# Patient Record
Sex: FEMALE | Race: WHITE
[De-identification: names, ages, dates, MRNs, and addresses within clinical notes are randomized per-mention and may not be internally consistent; named-entity substitution may affect disease eponyms.]

---

## 2018-09-10 ENCOUNTER — HOSPITAL ENCOUNTER (INPATIENT)
Dept: HOSPITAL 12 - SRC | Age: 33
LOS: 7 days | Discharge: OTHER INSTITUTION | DRG: 895 | End: 2018-09-17
Attending: INTERNAL MEDICINE | Admitting: INTERNAL MEDICINE
Payer: COMMERCIAL

## 2018-09-10 VITALS — DIASTOLIC BLOOD PRESSURE: 44 MMHG | SYSTOLIC BLOOD PRESSURE: 84 MMHG

## 2018-09-10 VITALS — DIASTOLIC BLOOD PRESSURE: 64 MMHG | SYSTOLIC BLOOD PRESSURE: 103 MMHG

## 2018-09-10 VITALS — BODY MASS INDEX: 35.89 KG/M2 | HEIGHT: 72 IN | WEIGHT: 265 LBS

## 2018-09-10 DIAGNOSIS — Z87.11: ICD-10-CM

## 2018-09-10 DIAGNOSIS — R00.2: ICD-10-CM

## 2018-09-10 DIAGNOSIS — R73.9: ICD-10-CM

## 2018-09-10 DIAGNOSIS — F10.239: Primary | ICD-10-CM
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
## 2018-09-10 DIAGNOSIS — Z65.3: ICD-10-CM

## 2018-09-10 DIAGNOSIS — F13.239: ICD-10-CM

## 2018-09-10 DIAGNOSIS — Z98.84: ICD-10-CM

## 2018-09-10 DIAGNOSIS — F60.3: ICD-10-CM

## 2018-09-10 DIAGNOSIS — F10.229: ICD-10-CM

## 2018-09-10 DIAGNOSIS — F31.81: ICD-10-CM

## 2018-09-10 DIAGNOSIS — F41.1: ICD-10-CM

## 2018-09-10 LAB
AMPHETAMINES UR QL SCN>1000 NG/ML: NEGATIVE
BARBITURATES UR QL SCN: NEGATIVE
BASOPHILS # BLD AUTO: 0.1 K/UL (ref 0–8)
BASOPHILS NFR BLD AUTO: 1.1 % (ref 0–2)
COCAINE UR QL SCN: NEGATIVE
EOSINOPHIL # BLD AUTO: 0.1 K/UL (ref 0–0.7)
EOSINOPHIL NFR BLD AUTO: 1.7 % (ref 0–7)
HCG UR QL: NEGATIVE
HCT VFR BLD AUTO: 36.8 % (ref 31.2–41.9)
HGB BLD-MCNC: 12.8 G/DL (ref 10.9–14.3)
LYMPHOCYTES # BLD AUTO: 2.3 K/UL (ref 20–40)
LYMPHOCYTES NFR BLD AUTO: 44.5 % (ref 20.5–51.5)
MCH RBC QN AUTO: 31.6 UUG (ref 24.7–32.8)
MCHC RBC AUTO-ENTMCNC: 35 G/DL (ref 32.3–35.6)
MCV RBC AUTO: 91 FL (ref 75.5–95.3)
MONOCYTES # BLD AUTO: 0.4 K/UL (ref 2–10)
MONOCYTES NFR BLD AUTO: 8.6 % (ref 0–11)
NEUTROPHILS # BLD AUTO: 2.3 K/UL (ref 1.8–8.9)
NEUTROPHILS NFR BLD AUTO: 44.1 % (ref 38.5–71.5)
OPIATES UR QL SCN: NEGATIVE
PCP UR QL SCN>25 NG/ML: NEGATIVE
PLATELET # BLD AUTO: 321 K/UL (ref 179–408)
RBC # BLD AUTO: 4.04 MIL/UL (ref 3.63–4.92)
THC UR QL SCN>50 NG/ML: NEGATIVE
WBC # BLD AUTO: 5.1 K/UL (ref 3.8–11.8)

## 2018-09-10 PROCEDURE — A4663 DIALYSIS BLOOD PRESSURE CUFF: HCPCS

## 2018-09-10 PROCEDURE — HZ2ZZZZ DETOXIFICATION SERVICES FOR SUBSTANCE ABUSE TREATMENT: ICD-10-PCS | Performed by: INTERNAL MEDICINE

## 2018-09-10 PROCEDURE — G0480 DRUG TEST DEF 1-7 CLASSES: HCPCS

## 2018-09-10 RX ADMIN — DEXTROSE ONE MG: 50 INJECTION, SOLUTION INTRAVENOUS at 19:50

## 2018-09-10 RX ADMIN — FOLIC ACID SCH MG: 1 TABLET ORAL at 18:30

## 2018-09-10 RX ADMIN — LORAZEPAM PRN MG: 1 TABLET ORAL at 20:21

## 2018-09-10 RX ADMIN — LORAZEPAM PRN MG: 1 TABLET ORAL at 23:04

## 2018-09-10 RX ADMIN — ACETAMINOPHEN PRN MG: 325 TABLET ORAL at 20:17

## 2018-09-10 RX ADMIN — THERA TABS SCH UDTAB: TAB at 18:30

## 2018-09-10 RX ADMIN — DEXTROSE ONE MG: 50 INJECTION, SOLUTION INTRAVENOUS at 17:30

## 2018-09-10 NOTE — NUR
PRN ATIVAN 1 MG AND TYLENOL ADMINISTRATION



CIWA 14. Pt presents with anxiety, flushed skin, agitation, fatigue, lethargy, headache 
4/10, increased HR, sweats, photosensitivity. Safety measures in place. Call light within 
reach. Will continue to monitor.
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
## 2018-09-10 NOTE — NUR
PRN ATIVAN 1 MG ADMINISTRATION



CIWA 11. Pt presents with anxiety, sweats, headache, photosensitivity. Safety measures in 
place. Call light within reach. Will continue to monitor.

-------------------------------------------------------------------------------

Addendum: 09/11/18 at 0142 by HIEU LACEY RN

-------------------------------------------------------------------------------

CORRECTION: CIWA 12

-------------------------------------------------------------------------------

Addendum: 09/11/18 at 0703 by HIEU LACEY RN

-------------------------------------------------------------------------------

BENADRYL ALSO ADMINISTERED. Pt requests sleep aid.

## 2018-09-10 NOTE — NUR
CIWA 14



Pt reports she feels like she is starting to withdrawal. Pt presents with anxiety, flushed 
skin, agitation, fatigue, lethargy, headache, increased HR, palpitations, sweats, 
photosensitivity, disheveled appearance and unkempt room. Pt is requesting Tylenol for 
headache.

## 2018-09-10 NOTE — NUR
END OF SHIFT

Report provided to night shift nurse. Pt is lying in bed resting. She is a 32 yo female 
admitted to [facility] for ETOH withdrawal. She was intoxicated on admission as is resting in 
bed. 5 day Ativan taper ordered to start tomorrow. She drank 480mL. Safety measures in 
place.

## 2018-09-10 NOTE — NUR
START OF SHIFT



Pt is a 34 y/o female admitted today for ETOH and benzo withdrawal. Pt will start a 5 day 
Ativan taper tomorrow morning, has PRN Ativan available tonight. CIWAs deferred during day 
shift d/t pt intoxication. Pt reports her last drink was around 1300. No PRNs administered. 
Upon assessment pt presents with anxiety, flushed skin, agitation, fatigue, lethargy, 
headache, increased HR, palpitations, sweats, photosensitivity, disheveled appearance and 
unkempt room. Medications due. Safety measures in place. Call light within reach. Will 
continue to monitor.

## 2018-09-10 NOTE — NUR
PRN ATIVAN 1 MG AND TYLENOL REASSESSMENT



CIWA 12. Pt has mild improvement in anxiety and headache. Pt reports that her anxiety is 
still her chief complaint. Safety measures in place. Call light within reach. Will continue 
to monitor.

## 2018-09-10 NOTE — NUR
Pre-admission

Pre-admission assessment performed in the intake department of [facility]. Pt 
is A&O and ambulatory. She was escorted into the hospital via wheelchair due to a recent 
metatarsal fracture of the right foot. She ambulates with a boot. She is moderately 
intoxicated but answers questions appropriately. Vital signs are: B/P 103/64, , RR 18, 
O2 sat 95%, T 98.0. She is friendly, her appearance is disheveled, and she smells of 
alcohol. Pt is stable and [facility]

## 2018-09-11 VITALS — DIASTOLIC BLOOD PRESSURE: 76 MMHG | SYSTOLIC BLOOD PRESSURE: 109 MMHG

## 2018-09-11 VITALS — DIASTOLIC BLOOD PRESSURE: 72 MMHG | SYSTOLIC BLOOD PRESSURE: 110 MMHG

## 2018-09-11 VITALS — SYSTOLIC BLOOD PRESSURE: 132 MMHG | DIASTOLIC BLOOD PRESSURE: 90 MMHG

## 2018-09-11 VITALS — SYSTOLIC BLOOD PRESSURE: 105 MMHG | DIASTOLIC BLOOD PRESSURE: 63 MMHG

## 2018-09-11 VITALS — SYSTOLIC BLOOD PRESSURE: 101 MMHG | DIASTOLIC BLOOD PRESSURE: 73 MMHG

## 2018-09-11 VITALS — DIASTOLIC BLOOD PRESSURE: 69 MMHG | SYSTOLIC BLOOD PRESSURE: 100 MMHG

## 2018-09-11 LAB
ALP SERPL-CCNC: 62 U/L (ref 50–136)
ALT SERPL W/O P-5'-P-CCNC: 19 U/L (ref 14–59)
AMYLASE SERPL-CCNC: 40 U/L (ref 25–115)
AST SERPL-CCNC: 17 U/L (ref 15–37)
BILIRUB SERPL-MCNC: 0.2 MG/DL (ref 0.2–1)
BUN SERPL-MCNC: 8 MG/DL (ref 7–18)
CHLORIDE SERPL-SCNC: 111 MMOL/L (ref 98–107)
CO2 SERPL-SCNC: 21 MMOL/L (ref 21–32)
CREAT SERPL-MCNC: 0.7 MG/DL (ref 0.6–1.3)
ETHANOL SERPL-MCNC: 115 MG/DL (ref 0–0)
GLUCOSE SERPL-MCNC: 124 MG/DL (ref 74–106)
LIPASE SERPL-CCNC: 261 U/L (ref 73–393)
MAGNESIUM SERPL-MCNC: 1.9 MG/DL (ref 1.8–2.4)
POTASSIUM SERPL-SCNC: 3.6 MMOL/L (ref 3.5–5.1)
TSH SERPL DL<=0.005 MIU/L-ACNC: 0.66 MIU/ML (ref 0.36–3.74)
WS STN SPEC: 6.8 G/DL (ref 6.4–8.2)

## 2018-09-11 PROCEDURE — HZ31ZZZ INDIVIDUAL COUNSELING FOR SUBSTANCE ABUSE TREATMENT, BEHAVIORAL: ICD-10-PCS

## 2018-09-11 PROCEDURE — HZ41ZZZ GROUP COUNSELING FOR SUBSTANCE ABUSE TREATMENT, BEHAVIORAL: ICD-10-PCS

## 2018-09-11 RX ADMIN — Medication SCH MG: at 21:55

## 2018-09-11 RX ADMIN — Medication SCH MG: at 13:26

## 2018-09-11 RX ADMIN — FOLIC ACID SCH MG: 1 TABLET ORAL at 08:50

## 2018-09-11 RX ADMIN — PANTOPRAZOLE SODIUM SCH MG: 40 TABLET, DELAYED RELEASE ORAL at 13:26

## 2018-09-11 RX ADMIN — THERA TABS SCH UDTAB: TAB at 08:50

## 2018-09-11 RX ADMIN — DIAZEPAM SCH MG: 10 TABLET ORAL at 16:33

## 2018-09-11 RX ADMIN — DIAZEPAM SCH MG: 10 TABLET ORAL at 21:55

## 2018-09-11 RX ADMIN — LAMOTRIGINE SCH MG: 100 TABLET ORAL at 21:55

## 2018-09-11 RX ADMIN — Medication SCH MG: at 08:50

## 2018-09-11 RX ADMIN — DIAZEPAM SCH MG: 10 TABLET ORAL at 13:56

## 2018-09-11 NOTE — NUR
Start of Shift



Patient presents with fatigue and tremors. Patient noted to be in a sad demeanor, is 
isolative, melancholic and emotional. Patient c/o increasing anxiety and easily gets 
agitated. Patient observed to avoid eye contact but is cooperative and compliant. Patient 
with unkempt hair and room with scattered candy wrappers. Encouraged patient to keep room 
clean, maintain a good hygiene and emphasized importance of a clean environment. Provided 
education on plan of care, medications, treatment and non-pharmacological ways to minimize 
anxiety. Fall, universal, seizure and safety prec in place. Call light within reach. Latest 
CIWA=12. Will continue to monitor.

## 2018-09-11 NOTE — NUR
CIWA 10



Pt has been isolative in room, intermittently sleeping, has an anxious mood and flat affect, 
presents anxiety, facial flushing, restlessness, fatigue, headache, sweating, light 
sensitivity, tremors are felt. Valium taper will be administered.

## 2018-09-11 NOTE — NUR
Start Of Shift / CIWA 11



Pt is a 32 y/o F admitted last night 09/10/18 for medically supervised ETOH and 
benzo-klonopin withdrawal. Pt will begin a 5 day ativan taper this morning. Received pt 
sitting in bed watching tv, A/Ox4, respirations even and unlabored. Pt is flushed and has a 
disheveled appearance, anxious mood, presents anxiety, restlessness, fatigue, headache, 
sweating, light sensitivity, tremors are felt. Encouraged pt to increase fluids as tolerated 
to facilitate in detox. Educated pt with med regimen and plan of care. Last CIWA 11 @MN, 
slept 8 hrs. Ativan 1 mg x2, tylenol prns were given last shift. Side rails upx2 and padded, 
bed in lowest position. Call light is within reach. Safety measures in place. Will continue 
to monitor.

## 2018-09-11 NOTE — NUR
PRN 



Tylenol 650 mg po prn given for headache 5/10 pain. Pt states she suddenly felt the headache 
once she had gotten up and was walking around. Safety measures in place. Will continue to 
monitor.

## 2018-09-11 NOTE — NUR
End Of Shift



Pt has been sleeping intermittently throughout shift. Ativan taper was replaced with Valium 
taper that started today and tolerating well. Last CIWA 10. Tylenol 650 mg po prn was given 
and effective for headache. Safety measures in place.

## 2018-09-11 NOTE — NUR
PRN ATIVAN 1 MG REASSESSMENT



CIWA 11. Pt has mild improvement in anxiety. Flushed skin, photosensitivity and mild 
headache present. Safety measures in place. Call light within reach. Will continue to 
monitor.

-------------------------------------------------------------------------------

Addendum: 09/11/18 at 0708 by HIEU LACEY RN

-------------------------------------------------------------------------------

PRN BENADRYL REASSESSMENT 



Pt appears more fatigued, reports that she will likely fall asleep soon.

## 2018-09-11 NOTE — NUR
END OF SHIFT



Pt is a 32 y/o female admitted today for ETOH and benzo withdrawal. Pt will start a 5 day 
Ativan taper today, has PRN Ativan available. Pt presented with anxiety, flushed skin, 
agitation, fatigue, lethargy, headache, increased HR, palpitations, sweats, 
photosensitivity, disheveled appearance and unkempt room. Scheduled medications and PRN 
Tylenol and Ativan 1 mg x 2 administered, effective in S/S of withdrawal AEB CIWA 14 lowered 
to CIWA 11. Pt slept 8 hours. Intake 500 ml, void x 2, stool x 0. Safety measures in place. 
Call light within reach. Pts needs have been met. Endorsed to day shift nurse.

## 2018-09-11 NOTE — NUR
CIWA 10



Pt is laying in bed watching tv, is flushed, has an anxious mood and flat affect, presents 
anxiety, restlessness, fatigue, headache, sweating, light sensitivity, tremors are felt. 
Refuses prns. Safety measures in place.

## 2018-09-11 NOTE — NUR
Reassessment



Pt verbalized tylenol was effective in decreasing headache pain to 3/10. Will continue to 
monitor.

## 2018-09-12 VITALS — SYSTOLIC BLOOD PRESSURE: 133 MMHG | DIASTOLIC BLOOD PRESSURE: 53 MMHG

## 2018-09-12 VITALS — DIASTOLIC BLOOD PRESSURE: 62 MMHG | SYSTOLIC BLOOD PRESSURE: 110 MMHG

## 2018-09-12 VITALS — DIASTOLIC BLOOD PRESSURE: 51 MMHG | SYSTOLIC BLOOD PRESSURE: 96 MMHG

## 2018-09-12 VITALS — SYSTOLIC BLOOD PRESSURE: 108 MMHG | DIASTOLIC BLOOD PRESSURE: 69 MMHG

## 2018-09-12 VITALS — SYSTOLIC BLOOD PRESSURE: 99 MMHG | DIASTOLIC BLOOD PRESSURE: 63 MMHG

## 2018-09-12 VITALS — DIASTOLIC BLOOD PRESSURE: 51 MMHG | SYSTOLIC BLOOD PRESSURE: 106 MMHG

## 2018-09-12 RX ADMIN — Medication SCH MG: at 08:22

## 2018-09-12 RX ADMIN — DIAZEPAM SCH MG: 10 TABLET ORAL at 21:39

## 2018-09-12 RX ADMIN — Medication SCH MG: at 21:40

## 2018-09-12 RX ADMIN — LAMOTRIGINE SCH MG: 100 TABLET ORAL at 21:39

## 2018-09-12 RX ADMIN — PANTOPRAZOLE SODIUM SCH MG: 40 TABLET, DELAYED RELEASE ORAL at 08:21

## 2018-09-12 RX ADMIN — Medication SCH MG: at 21:39

## 2018-09-12 RX ADMIN — DIAZEPAM SCH MG: 10 TABLET ORAL at 08:22

## 2018-09-12 RX ADMIN — DIAZEPAM SCH MG: 10 TABLET ORAL at 15:14

## 2018-09-12 RX ADMIN — THERA TABS SCH UDTAB: TAB at 08:22

## 2018-09-12 RX ADMIN — ACETAMINOPHEN PRN MG: 325 TABLET ORAL at 21:49

## 2018-09-12 RX ADMIN — FOLIC ACID SCH MG: 1 TABLET ORAL at 08:22

## 2018-09-12 NOTE — NUR
PRN BENADRYL 50 MG  1TABLET PO ADMINISTRATION

Patient c/o insomnia and asked aid.  PRN Benadryl  50 mg 1 tablet was administered as 
ordered with full glass of water at 2139.  Patient tolerated well.  Safe and calm 
environment provided.  All needs met.  Safety measures in place: Call light within reach, 
bed locked in lowest position,  padded bed rails up bilaterally.  Will be continue 
monitoring closely.

## 2018-09-12 NOTE — NUR
End of Shift



Patient continues to complain of fatigue, tremors and is observed to be isolative, 
melancholic and emotional. Patient also noted to have intermittent anxiety. Patients room 
continues to be messy. Encouraged patient to keep room and hygiene clean. Fall, universal, 
seizure and safety prec in place. Call light within reach. Latest CIWA=10, slept for 7 
hours. Endorsed to AM shift nurse for continuity of care.

## 2018-09-12 NOTE — NUR
CIWA 11



Pt is sitting up in bed eating lunch. Pt presents a flushed face, anxiety, restlessness, 
fatigue, headache, sweating, light sensitivity, and tremors are felt. Refuses prns at this 
time. Will continue to monitor.

## 2018-09-12 NOTE — NUR
End Of Shift



Pt has been mostly isolative and sleeping intermittently in room during shift. Pt continues 
on a valium taper and tolerating well. No prns given. Last CIWA 11. Fluid intake 1960 ml, 
voided x3, bm 0. Safety measures in place. Endorsement given to night shift nurse.

## 2018-09-12 NOTE — NUR
START OF SHIFT NOTE:

Endorsed patient is 33  year old female,  admitted  for Benzodiazepines and Alcohol 
withdrawal, continues ordered 5 day Valium taper since 09/11/2018 which tolerated well.   
Withdrawal symptoms will be closely monitoring.  Patient remains compliant with treatment, 
medications, and diet regimen.  Patient is alert and oriented x4, cooperative, with stable 
gait, clear soft speech.  Patient reports allergy to NSAID's, is on Full Code, Regular diet, 
Fall and Seizures precautions.  Patient denies history of seizures.  The latest  CIWA= 11 at 
1605. Throughout the day shift  patient presented with  withdrawal symptoms such as anxiety, 
agitation, irritability, nervousness, barely sweating, very mild headache, tremors, 
restlessness, fatigue.  Patient was encouraged to fluids intake as tolerated, and to attend 
groups activities.  No PRN medications was administered during day shift, per day shift 
nurse report.   Patient was encouraged to fluids intake as tolerated, and to attend groups 
activities.  Safe and calm environment provided.   All needs met.  Safety measures in place: 
Call light within reach, bed locked in lowest position,  padded bed rails up bilaterally.  
Patient endorsed by outgoing day shift nurse.  Report received.  Will be monitoring closely.

## 2018-09-12 NOTE — NUR
PRN RE-ASSESSMENT

Patient is sleeping  on his side.  Respirations are even and unlabored.  RR: 14.  PRN 
Benadryl 50 mg PO administered for insomnia at 2139 was effective.   Safe and calm 
environment provided.  All needs met.  Safety measures in place:  Call light within reach, 
bed locked in lowest position, padded bed rails up bilaterally.  Will continue monitoring 
closely.

## 2018-09-12 NOTE — NUR
Start Of Shift / CIWA 10



Pt is a 34 y/o F admitted on 09/10/18 for medically supervised ETOH and benzo-klonopin 
withdrawal. Pt continues on a 5 day valium taper, today being second day and tolerating 
well. Received pt sitting in bed still drowsy, A/Ox4, respirations even and unlabored. Pt 
has a disheveled appearance, flushed, presents anxiety, restlessness, fatigue, headache, 
sweating, light sensitivity, tremors are felt. Encouraged pt to increase fluids as tolerated 
to facilitate in detox. Educated pt with med regimen and plan of care. Last CIWA 10, slept 7 
hrs. Benadryl prn given last shift. Side rails upx2 and padded, bed in lowest position. Call 
light is within reach. Safety measures in place. Will continue to monitor.

## 2018-09-12 NOTE — NUR
[facility] 11



Patient presents with anxiety, tremors and intermittent chills. Patient also with 
sensitivity to light.

## 2018-09-12 NOTE — NUR
PRN TYLENOL 650 MG PO ADMINISTRATION

Patient c/o headache "5/10" and asked aid.  PRN Tylenol 650 mg PO administered for headache 
at 2149 as ordered.  Patient tolerated well.  Safe and calm environment provided.  All needs 
met.  Safety measures in place: Call light within reach, bed locked in lowest position, 
padded bed rails up bilaterally.  Will continue monitoring closely.

## 2018-09-12 NOTE — NUR
CIWA  ASSESSMENT

CIWA=9 at 2000.  The patient presented with following withdrawal symptoms such as anxiety, 
agitation, depression, irritability, nervousness, barely sweating, tremors that can be felt 
not observe, restlessness, and fatigue.  Scheduled medications will be administrated as 
ordered.  Encouraged to intake fluids as tolerated.  Safe and calm environment provided.  
All needs met.   Safety measures: Call light within reach, bed locked in lowest position, 
padded bed rails up x2.  Will continue to monitor closely.

## 2018-09-12 NOTE — NUR
PRN RE-ASSESSMENT

Patient is sleeping quietly.  Respirations are even and unlabored.  RR: 14.  PRN Tylenol 650 
mg PO administered for headache at 2149 was effective.  Safe and calm environment provided.  
All needs met.  Safety measures in place: Call light within reach, bed locked in lowest 
position, padded bed rails up bilaterally.  Will continue monitoring closely.

## 2018-09-12 NOTE — NUR
CIWA 10



Patient verbalized feeling fatigue and continues to have tremors and is flushed. Patient 
continues to be melancholic.

## 2018-09-13 VITALS — SYSTOLIC BLOOD PRESSURE: 92 MMHG | DIASTOLIC BLOOD PRESSURE: 54 MMHG

## 2018-09-13 VITALS — DIASTOLIC BLOOD PRESSURE: 52 MMHG | SYSTOLIC BLOOD PRESSURE: 96 MMHG

## 2018-09-13 VITALS — DIASTOLIC BLOOD PRESSURE: 69 MMHG | SYSTOLIC BLOOD PRESSURE: 118 MMHG

## 2018-09-13 VITALS — DIASTOLIC BLOOD PRESSURE: 70 MMHG | SYSTOLIC BLOOD PRESSURE: 112 MMHG

## 2018-09-13 VITALS — DIASTOLIC BLOOD PRESSURE: 52 MMHG | SYSTOLIC BLOOD PRESSURE: 101 MMHG

## 2018-09-13 VITALS — SYSTOLIC BLOOD PRESSURE: 97 MMHG | DIASTOLIC BLOOD PRESSURE: 57 MMHG

## 2018-09-13 RX ADMIN — DIAZEPAM SCH MG: 5 TABLET ORAL at 16:07

## 2018-09-13 RX ADMIN — FOLIC ACID SCH MG: 1 TABLET ORAL at 08:40

## 2018-09-13 RX ADMIN — Medication SCH MG: at 20:34

## 2018-09-13 RX ADMIN — THERA TABS SCH UDTAB: TAB at 08:40

## 2018-09-13 RX ADMIN — LAMOTRIGINE SCH MG: 100 TABLET ORAL at 20:35

## 2018-09-13 RX ADMIN — DIAZEPAM SCH MG: 5 TABLET ORAL at 12:33

## 2018-09-13 RX ADMIN — Medication SCH MG: at 08:40

## 2018-09-13 RX ADMIN — PANTOPRAZOLE SODIUM SCH MG: 40 TABLET, DELAYED RELEASE ORAL at 06:17

## 2018-09-13 RX ADMIN — CETIRIZINE HYDROCHLORIDE SCH MG: 10 TABLET, FILM COATED ORAL at 14:59

## 2018-09-13 RX ADMIN — DIAZEPAM SCH MG: 5 TABLET ORAL at 20:35

## 2018-09-13 RX ADMIN — DIAZEPAM SCH MG: 5 TABLET ORAL at 08:40

## 2018-09-13 NOTE — NUR
END OF SHIFT NOTE:

Endorsed patient is alert and oriented x4, cooperative, clear soft speech,  with stable 
gait,tolerated well with ordered 5 day Valium taper for Benzodiazepines and Alcohol 
withdrawal.  Throughout night shift , patient experienced  anxiety, agitation, irritability, 
headache, nervousness, flashed face, sweating,  clammy skin, bilateral tremors, insomnia,  
restlessness, and fatigue.  CIWA=9 at 2000, CIWA=12 at 0000.  Latest CIWA=12 at 0400: 
Patient was encouraged to fluids intake as tolerated, and to attend groups activities.  
Withdrawal symptoms will be closely monitoring.  PRN Benadryl 50 mg PO administered for 
insomnia at 2139, PRN Tylenol 650 mg PO administered for headache at 2149, PRN Vistaril 25 
mg PO administered for anxiety at 0034, and were effective.   Patient remains compliant with 
treatment, medications, and diet regimen.  Educated for non pharmacological method that can 
be used to help control pain.  Encouraged to intake fluids as tolerated.  Encouraged to 
attend group activities.  Patient slept for 7 hours, intake 1,000 ml, output: voided x2.  
Safe and calm environment provided.  All needs met.  Safety measures: Call light within 
reach, bed locked in lowest position, padded bed rails up x2.  Endorsed to day shift nurse.

## 2018-09-13 NOTE — NUR
Therapist prompted client to attend all group therapy sessions. Client explained that she is 
too fatigued to attend at this time but will attend later in the day.

## 2018-09-13 NOTE — NUR
Start of Shift Notes/CIWA Assessment:

Received patient in his room. Alert and oriented x 4. Verbally responsive. Denies S/I or H/I 
noted. No AV hallucinations noted. Patient is noted with facial flushing, and intermittent 
perspiration. She appears to be anxious, agitated and restless. Unable to keep still. Pacing 
in her room. Room appears messy with garbage all over the floor. Encouraged to maintain her 
personal hygiene and space. CIWA 16. Patient is a 33 year old female admitted for ETOH/BZO 
withdrawal who was placed on a 5-day Valium taper as ordered. No adverse reactions noted. 
Per night report, patient was given Benadryl, Tylenol, and Vistaril during the night. Last 
CIWA 12. Slept for 7 hours. Educated patient on her current plan of care for the day and her 
medication regimen. Encouraged oral fluid intake and encouraged group participation to learn 
new skills to prevent relapse. All needs met and attended. Call light in reach. Will 
continue to monitor closely.

## 2018-09-13 NOTE — NUR
CIWA  ASSESSMENT

CIWA=11 at 2000.  The patient presented anxious and agitated, with irritability, 
nervousness, tremors, restlessness, and fatigue.   Scheduled medications will be 
administrated as ordered.  Encouraged to intake fluids as tolerated.  Safe and calm 
environment provided.  All needs met.  Safety measures: Call light within reach, bed locked 
in lowest position,  padded bed rails up x2.  Will continue to monitor closely.

## 2018-09-13 NOTE — NUR
PRN VISTARIL 25 MG PO ADMINISTRATION

PRN Vistaril 25 mg PO administered  for anxiety at 0034, as ordered with full glass of 
water.  Patient tolerated well.  Safe and calm environment provided.  All needs met.  Safety 
measures in place: Call light within reach, bed locked in lowest position, padded bed rails 
up bilaterally.  Will continue to monitor closely.

## 2018-09-13 NOTE — NUR
START OF SHIFT NOTE

The patient endorsed by outgoing day shift nurse.   Patient is alert and oriented x4:person, 
place, time, and situation.  She is cooperative, presented for withdrawal from 
Benzodiazepines, and Alcohol withdrawal since 09/10/2018, and tolerated well with ordered 5 
day Valium taper.  Today is third day.  Withdrawal symptoms will be closely monitoring.  
Latest CIWA=11 at 1602.  During day shift patient experienced moderate withdrawal symptoms 
such as anxiety, agitation, irritability, nervousness, general body aches, bilateral  
tremors, sweating, restlessness,  and fatigue.  No PRN Medications was administered last day 
shift, per report.  Patient remains compliant with treatment, medications, and diet regime.  
Patient was encouraged to attend groups activities, and  to increased her activities.   
Encouraged to fluids intake as tolerated.  Safe and calm environment provided.   All needs 
met.   Safety measures in place: Call light within reach,  bed is locked in lowest position, 
padded bed rails up x2.  Report received from outgoing day shift nurse.  Will continue to 
monitor closely.

## 2018-09-13 NOTE — NUR
PRN RE-ASSESSMENT

Patient is sleeping.  RR:15. Respirations are even and unlabored.  PRN Benadryl 50 mg PO 
administered for insomnia at 2034 was effective.  Safe and calm environment provided.  All 
needs met.  Safety measures in place:  Call light within reach, bed locked in lowest 
position, padded bed rails up bilaterally.  Will continue monitoring closely.

## 2018-09-13 NOTE — NUR
PRN BENADRYL 50 MG  1TABLET PO ADMINISTRATION

PRN Benadryl  50 mg 1 tablet was administered for insomnia at 2034.  Patient tolerated well. 
 Safe and calm environment provided.   All needs met.  Safety measures in place:  Call light 
within reach, bed locked in lowest position, padded bed rails up bilaterally.  Will continue 
monitoring closely.

## 2018-09-13 NOTE — NUR
CIWA  ASSESSMENT

CIWA=12 at 0000.  The patient experienced  increased anxiety, agitation, irritability, 
nervousness, sweating, flashed skin, clammy skin, tremors, restlessness,  and fatigue.   PRN 
Vistaril 25 mg  PO will be administrated as ordered.  Encouraged to intake fluids as 
tolerated.  Safe and calm environment provided.  All needs met.  Safety measures: Call light 
within reach, bed locked in lowest position, padded bed rails up x2.  Will continue to 
monitor closely.

## 2018-09-13 NOTE — NUR
End of Shift Notes:

Patient continues to be on 5-day Valium as ordered to manage symptoms related to ETOH and 
BZO withdrawal. No adverse reactions noted. VS monitored closely. Has hx of heart 
palpitations. On Metoprolol as ordered. No significant abnormalities noted on patients VS. 
Withdrawal symptoms were closely monitored. Initial CIWA 16, patient presented with gross 
tremors, intermittent perspiration, anhedonia, anxiety, difficulty concentrating, flushed 
face, fatigue, malaise and generalized discomfort. Last CIWA 11. Patient verbalizes that 
Valium has been effective in reducing her withdrawal symptoms. She was started on Zyrtec 
today as ordered for allergic symptoms.  Unable to participate in group and activities due 
to her withdrawal symptoms. Appetite good. All needs met and attended. Will continue to 
monitor.

## 2018-09-13 NOTE — NUR
CIWA  ASSESSMENT

CIWA=12 at 0400. The patient experienced anxiety, agitation, irritability, nervousness, 
clammy skin, sweating, flashed face, bilateral  tremors, restlessness, and fatigue.  All 
needs met. Safety measures in place: Call light within reach, bed locked in lowest position, 
padded bed rails up x2.  Safe and calm environment provided.  Will continue to monitor 
closely.

## 2018-09-13 NOTE — NUR
PRN RE-ASSESSMENT

Patient is sleeping on her side.  Respirations are even and unlabored.  RR: 15. PRN Vistaril 
25 mg PO administered for anxiety at 0034  was effective.  Safe and calm environment 
provided.  All needs met.  Safety measures in place: Call light within reach, bed locked in 
lowest position, padded bed rails up bilaterally.  Will continue monitoring closely.

## 2018-09-13 NOTE — NUR
CIWA Assessment:

CIWA 13, patient presented with gross tremors, anxiety, agitation, intermittent 
perspiration, worried facial expression, and generalized discomfort. She was able to shower 
before lunch and consumed 100% of lunch tray. Will continue to monitor.

## 2018-09-13 NOTE — NUR
CIWA Assessment:

CIWA 11, patient continues to present with gross tremors, anxiety, agitation, intermittent 
perspiration, pacing along the hallway at times, periods of fatigue and malaise and 
generalized discomfort. Offered PRNs. Support provided. Oral fluids encouraged. Will 
medicate patient with detox meds as ordered. Will continue to monitor.

## 2018-09-14 VITALS — SYSTOLIC BLOOD PRESSURE: 90 MMHG | DIASTOLIC BLOOD PRESSURE: 55 MMHG

## 2018-09-14 VITALS — SYSTOLIC BLOOD PRESSURE: 104 MMHG | DIASTOLIC BLOOD PRESSURE: 66 MMHG

## 2018-09-14 VITALS — SYSTOLIC BLOOD PRESSURE: 98 MMHG | DIASTOLIC BLOOD PRESSURE: 62 MMHG

## 2018-09-14 VITALS — DIASTOLIC BLOOD PRESSURE: 76 MMHG | SYSTOLIC BLOOD PRESSURE: 114 MMHG

## 2018-09-14 VITALS — SYSTOLIC BLOOD PRESSURE: 93 MMHG | DIASTOLIC BLOOD PRESSURE: 57 MMHG

## 2018-09-14 VITALS — DIASTOLIC BLOOD PRESSURE: 69 MMHG | SYSTOLIC BLOOD PRESSURE: 113 MMHG

## 2018-09-14 RX ADMIN — PANTOPRAZOLE SODIUM SCH MG: 40 TABLET, DELAYED RELEASE ORAL at 06:54

## 2018-09-14 RX ADMIN — DIAZEPAM SCH MG: 5 TABLET ORAL at 15:24

## 2018-09-14 RX ADMIN — Medication SCH MG: at 08:46

## 2018-09-14 RX ADMIN — FOLIC ACID SCH MG: 1 TABLET ORAL at 08:45

## 2018-09-14 RX ADMIN — Medication SCH MG: at 20:38

## 2018-09-14 RX ADMIN — CETIRIZINE HYDROCHLORIDE SCH MG: 10 TABLET, FILM COATED ORAL at 08:45

## 2018-09-14 RX ADMIN — DIAZEPAM SCH MG: 5 TABLET ORAL at 08:45

## 2018-09-14 RX ADMIN — DIAZEPAM SCH MG: 5 TABLET ORAL at 20:38

## 2018-09-14 RX ADMIN — Medication SCH MG: at 08:45

## 2018-09-14 RX ADMIN — LAMOTRIGINE SCH MG: 100 TABLET ORAL at 20:39

## 2018-09-14 RX ADMIN — THERA TABS SCH UDTAB: TAB at 08:45

## 2018-09-14 NOTE — NUR
End of Shift

Writer provided report on 33 year old female admitted to [facility] on 9/10/18 for medical 
management of Benzodiazepine and ETOH withdrawals. Pt endorses allergies to NSAIDS and 
aspirin. PMH to include tachycardia, palpitations and gastric bypass. Pt currently with a 
fracture of the right foot, requiring an orthotic boot.  PPH of anxiety and depression. Pt 
currently on a Valium taper with last CIWA 7. Pt is A/O x4 and able to make needs known. 
Linear thought process with clear speech pattern. Pt has rested thru out the day and 
complains of lethargy. Pt is diaphoretic, anxious and has fine tremors.  Bed in low position 
with wheels locked and side rails up x2.

## 2018-09-14 NOTE — NUR
CIWA 8

Pt with fine tremors, diaphoresis, anxiety and restlessness. Complaints of chills. Will 
continue to monitor, support and encourage according to plan of care.

## 2018-09-14 NOTE — NUR
CIWA  ASSESSMENT

CIWA=11 at 0000. The patient noted anxious,agitated with flashed face, clammy skin, 
sweating,i rritability, nervousness, enlarged for room light pupils, nasal congestion, 
bilateral tremors, restlessness and  fatigue.  Encouraged to intake fluids as tolerated.  
Safe and calm environment provided.  All needs met.  Safety measures: Call light within 
reach, bed locked in lowest position,  padded bed rails up x2.  Will continue to monitor 
closely.

## 2018-09-14 NOTE — NUR
START OF SHIFT NOTE:

Received patient is a 33 year old female, admitted for Benzodiazepines and Alcohol 
withdrawal, tolerated well with  ordered 5 day Valium taper since 09/11/2018, today is third 
day.  Withdrawal symptoms will be closely monitoring.  Patient remains compliant with 
treatment, medications, and diet regimen.   The latest  CIWA= 17 at 1707.  As reported by 
day shift nurse, during day shift this  patient presented with agitation, irritability, 
nervousness, barely sweating, tremors, restlessness, and fatigue.  Patient was encouraged to 
fluids intake as tolerated.   Encouraged to attend groups activities.  No PRN medications 
was administered during day shift, per day shift nurse report.  Patient was encouraged to 
fluids intake as tolerated, and to attend groups activities.  Safe and calm environment 
provided.  All needs met.  Safety measures in place: Call light within reach, bed locked in 
lowest position,  padded bed rails up bilaterally.  Patient endorsed by outgoing day shift 
nurse.  Report received.  Will be monitoring closely.

## 2018-09-14 NOTE — NUR
END OF SHIFT NOTE

The patient endorsed to day shift nurse.   Patient is a 33 year old female, admitted for  
benzodiazepines and alcohol withdrawal on 09/10/2018, and continues ordered 5 day Valium 
taper.  Today is day 4.  Withdrawal symptoms was closely monitoring.  Initial CIWA=11 at 
2000, CIWA=11 at 0000.  The most recent  CIWA=11 at 0400. During my shift patient 
experienced anxiety, agitation, irritability, nervousness, bilateral tremors, sweating, 
restlessness,  and fatigue.  PRN Benadryl 50 mg PO administered for insomnia at 2034 was 
effective.  Patient remains compliant with medications Patient slept for 6 hours, intake 
1,500 ml, output: voided x3.  Encouraged to fluids intake as tolerated.  Encouraged to 
attend groups activities.  Safe and calm environment provided.  All needs met.  Safety 
measures in place: Call light within reach, bed locked in lowest position, padded bed rails 
up bilaterally. Patient endorsed to day shift nurse.

## 2018-09-14 NOTE — NUR
PRN BENADRYL 50 MG  1TABLET PO ADMINISTRATION

PRN Benadryl 50 mg 1 tablet was administered for insomnia at 2038.  Patient tolerated well.  
Safe and calm environment provided.  All needs met.  Safety measures in place: Call light 
within reach, bed is locked in lowest position, padded bed rails up x2.  Will continue to 
monitor closely.

## 2018-09-14 NOTE — NUR
CIWA  ASSESSMENT

CIWA=11  at 0400  Patient is anxious, agitated, has flashed face and clammy skin, sweating, 
mild bilateral tremors, and restlessness.  Safe and calm environment provided.  All needs 
met.  Safety measures: Call light within reach, bed locked in lowest position, padded bed 
rails up x2.  Will continue to monitor closely.

## 2018-09-14 NOTE — NUR
PRN  RE-ASSESSMENT

Patient is sleeping quietly on her side.  RR:15. Respirations are even and unlabored.  PRN 
Benadryl  50 mg 1 tablet administered for insomnia at 2038 was effective.  Safe and calm 
environment provided.  All needs met.  Safety measures in place: Call light within reach, 
bed locked in lowest position, padded bed rails up bilaterally.  Will be continue to monitor 
closely.

## 2018-09-14 NOTE — NUR
CIWA  ASSESSMENT

Patient experienced moderate withdrawal symptoms, such as anxiety, agitation,irritability, 
nervousness, palpitation, noted with flashed face, clammy skin, sweating, bilateral tremors, 
and restlessness.    CIWA=12 at 2000.   Safe and calm environment provided.  All needs met.  
Safety measures: Call light within reach, bed locked in lowest position,  padded bed rails 
up x2.  Will continue to monitor closely.

## 2018-09-14 NOTE — NUR
CIWA 7

Pt is anxious and restless with fine tremors and chills. Pt with flushed and moist skin.  
Will continue to monitor, support and encourage according to plan of care.

## 2018-09-14 NOTE — NUR
CIWA 9

Pt is anxious, restless, diaphoretic and has fine tremors with complaints of chills.Will 
continue to monitor, support and encourage according to plan of care.

## 2018-09-14 NOTE — NUR
Start of Shift

Writer received report on 33 year old female admitted to [facility] on 9/10/18 for medical 
management of Benzodiazepine and ETOH withdrawals. Pt endorses allergies to NSAIDS and 
aspirin. PMH to include tachycardia, palpitations and gastric bypass. Pt currently with a 
fracture of the right foot, requiring an orthotic boot.  PPH of anxiety and depression. Pt 
currently on a Valium taper with last CIWA 11, per report. Writer encounters pt in pts 
room. Pt is resting with eyes closed, even and unlabored respirations. Bed in low position 
with wheels locked and side rails up x2. Will continue to monitor, support and encourage 
according to plan of care.

## 2018-09-14 NOTE — NUR
END OF SHIFT NOTE

The patient endorsed to day shift nurse.   Patient is a 33 year old female, admitted for  
benzodiazepines and alcohol withdrawal on 09/10/2018, and continues ordered 5 day Valium 
taper.  Today is day 4.  Withdrawal symptoms was closely monitoring.  Initial CIWA=11 at 
2000, CIWA=11 at 0000.  The most recent  CIWA=11 at 0400. During my shift patient 
experienced anxiety, agitation, irritability, nervousness, bilateral tremors, sweating, 
restlessness,  and fatigue.  PRN Benadryl 50 mg PO administered for insomnia at 2034 was 
effective.  Patient remains compliant with medications Patient slept for 6 hours, intake 
1,500 ml, output: voided x3.  Encouraged to fluids intake as tolerated.  Encouraged to 
attend groups activities.  Safe and calm environment provided.  All needs met.  Safety 
measures in place: Call light within reach, bed locked in lowest position, padded bed rails 
up bilaterally. Patient endorsed to day shift nurse. 

-------------------------------------------------------------------------------

Addendum: 09/15/18 at 0311 by CORA SCHNEIDER RN

-------------------------------------------------------------------------------

******error, Duplicate entry*****

## 2018-09-15 VITALS — DIASTOLIC BLOOD PRESSURE: 73 MMHG | SYSTOLIC BLOOD PRESSURE: 114 MMHG

## 2018-09-15 VITALS — SYSTOLIC BLOOD PRESSURE: 92 MMHG | DIASTOLIC BLOOD PRESSURE: 54 MMHG

## 2018-09-15 VITALS — SYSTOLIC BLOOD PRESSURE: 93 MMHG | DIASTOLIC BLOOD PRESSURE: 48 MMHG

## 2018-09-15 VITALS — DIASTOLIC BLOOD PRESSURE: 57 MMHG | SYSTOLIC BLOOD PRESSURE: 93 MMHG

## 2018-09-15 VITALS — SYSTOLIC BLOOD PRESSURE: 105 MMHG | DIASTOLIC BLOOD PRESSURE: 60 MMHG

## 2018-09-15 VITALS — DIASTOLIC BLOOD PRESSURE: 63 MMHG | SYSTOLIC BLOOD PRESSURE: 100 MMHG

## 2018-09-15 RX ADMIN — THERA TABS SCH UDTAB: TAB at 09:06

## 2018-09-15 RX ADMIN — DIAZEPAM SCH MG: 5 TABLET ORAL at 09:06

## 2018-09-15 RX ADMIN — Medication SCH MG: at 09:00

## 2018-09-15 RX ADMIN — FOLIC ACID SCH MG: 1 TABLET ORAL at 09:06

## 2018-09-15 RX ADMIN — CETIRIZINE HYDROCHLORIDE SCH MG: 10 TABLET, FILM COATED ORAL at 09:07

## 2018-09-15 RX ADMIN — Medication SCH MG: at 20:16

## 2018-09-15 RX ADMIN — ACETAMINOPHEN PRN MG: 325 TABLET ORAL at 12:32

## 2018-09-15 RX ADMIN — LAMOTRIGINE SCH MG: 100 TABLET ORAL at 20:16

## 2018-09-15 RX ADMIN — DIAZEPAM SCH MG: 5 TABLET ORAL at 20:16

## 2018-09-15 RX ADMIN — PANTOPRAZOLE SODIUM SCH MG: 40 TABLET, DELAYED RELEASE ORAL at 07:04

## 2018-09-15 RX ADMIN — Medication SCH MG: at 09:09

## 2018-09-15 NOTE — NUR
CIWA 9- Pt continues to c/o anxiety, fatigue, flushed face, body aches, and depressed mood. 
Pt slept most of the afternoon. Head ache resolved earlier. Will continue with Valium taper 
per protocol and administer PRN medications for withdrawal symptoms.

## 2018-09-15 NOTE — NUR
JAVIERWA 11- Pt presents with head ache #5/10, anxiety, malaise, flushed face, anhedonia and 
depressed mood. Pt did attend group therapy this morning. will continue with Valium taper 
per protocol and administer PRN medications for withdrawal symptoms.

## 2018-09-15 NOTE — NUR
CIWA 11- Pt presents with anxiety, fatigue, agitation, flushed face, difficulty 
concentrating, anhedonia and depressed mood.

## 2018-09-15 NOTE — NUR
Start of Shift

Last CIWA 11 at 0400.  Pt is on 5 day Valium taper. Pt A&Ox4. Pt continues to have anxiety, 
fatigue, agitation, flushed face, difficulty concentrating, anhedonia and depressed mood. Pt 
room littered with empty water bottles, food wrappers and clothes.  Encouraged Pt to 
participate in group activities, socialize with others and identify positive coping skills 
to maintain sobriety. Fall precautions and all safety measures in place. Side rails up x2. 
Call light functioning and within reach. All needs attended and met. Will continue to assess 
for withdrawal symptoms.

## 2018-09-15 NOTE — NUR
End of Shift

Last CIWA 9 at 1600.  Pt is on 5 day Valium taper, today is day 4. Pt A&Ox4. Pt is isolative 
and doesnt leave her room very often. Pt withdrawal symptoms include anxiety, fine tremors, 
 fatigue, flushed face, difficulty concentrating, anhedonia and depressed mood. PRN given 
today was Tylenol. Encouraged Pt to participate in group activities, socialize with others 
and identify positive coping skills to maintain sobriety. Pt only attended one group therapy 
session today. PO fluids 2000 ml, Voids X6, BMx1. Fall precautions and all safety measures 
in place. Side rails up x2. Call light functioning and within reach. All needs attended and 
met. Will continue to assess for withdrawal symptoms. Endorsed to PM shift.

## 2018-09-15 NOTE — NUR
PRN  RE-ASSESSMENT

Patient is reports ,"Medications help to me, my anxiety decreased, and I want to sleep now". 
 CIWA=10. LR=768/86.  PRN Ativan  1 mg  PO  administered for CIWA=12 at 0335, and PRN 
Clonidine 0.1 mg  PO administered for /94 at 0336 were effective.  Safe and calm 
environment provided.  All needs met.  Safety measures in place: Call light within reach, 
bed locked in lowest position,  padded bed rails up bilaterally.  Will be continue 
monitoring closely. 


-------------------------------------------------------------------------------

Addendum: 09/15/18 at 0447 by CORA SCHNEIDER RN

-------------------------------------------------------------------------------

*****error wrong patient*****

## 2018-09-15 NOTE — NUR
END OF SHIFT NOTE:

The patient is 33 year old female presented for benzodiazepines and alcohol withdrawal, 
tolerated well with ordered 5 day Valium taper.  Today is day 4.  Withdrawal symptoms will 
be closely monitoring.   CIWA=12 at 2000, CIWA=10 at 0000.  Most recent CIWA=11 at 0400:  
Patient was noted with anxiety, agitation, irritability, nervousness, palpitation, noted 
with flashed face, clammy skin, sweating, bilateral tremors, and restlessness.  Patient was 
encouraged to fluids intake as tolerated, and to attend groups activities. PRN Benadryl 50 
mg PO administered for insomnia at 2038, and was effective.   Patient remains compliant with 
treatment, medications, and diet regimen.  Educated for non pharmacological method that can 
be used to help control pain.  Encouraged to intake fluids as tolerated.  Encouraged to 
attend group activities.  Patient slept for 8 hours, intake 750 ml, voided x2.  Safe and 
calm environment provided.  All needs met.  Safety measures: Call light within reach, bed 
locked in lowest position, padded bed rails up x2.  Endorsed to day shift nurse.

## 2018-09-15 NOTE — NUR
CIWA  ASSESSMENT

CIWA=11 at 0000. The patient presented with anxiety,agitation, irritability, nervousness, 
barely sweating, tremors, restlessness,  and fatigue.  Safe and calm environment provided.   
All needs met.  Safety measures: Call light within reach, bed locked in lowest position,  
padded bed rails up x2.  Will continue to monitor closely.

## 2018-09-15 NOTE — NUR
CIWA 11

patient presents with increased anxiety, flushed face, bilateral hand tremors, restlessness 
and irritability.

Scheduled Ativan 1mg PO was given

## 2018-09-15 NOTE — NUR
CIWA =11

Patient experienced following withdrawal symptoms, such as anxiety, agitation, nervousness, 
sweating, bilateral tremors that can be felt, not observe, and restlessness.  Safe and calm 
environment provided.  All needs met.  Safety measures: Call light within reach, bed locked 
in lowest position, padded bed rails up x2.  Will continue to monitor closely.

## 2018-09-15 NOTE — NUR
Start Of Shift:

Patient is a 33 yr old female who was admitted to [facility] on 9/10 /18 for a medically 
supervised withdrawal from ETOH ( Vodka), Benzodiazepines ( Klonopin) and also states she 
uses Ambien occasionally, she has been placed on a 5 day Valium taper and this is day 4. PRN 
Tylenol was given on day shift for headache. She is currently in her room watching TV and 
voices no concerns. Continue to follow MD plan of care and offer support as needed.

## 2018-09-16 VITALS — DIASTOLIC BLOOD PRESSURE: 79 MMHG | SYSTOLIC BLOOD PRESSURE: 154 MMHG

## 2018-09-16 VITALS — SYSTOLIC BLOOD PRESSURE: 100 MMHG | DIASTOLIC BLOOD PRESSURE: 80 MMHG

## 2018-09-16 VITALS — DIASTOLIC BLOOD PRESSURE: 61 MMHG | SYSTOLIC BLOOD PRESSURE: 99 MMHG

## 2018-09-16 VITALS — DIASTOLIC BLOOD PRESSURE: 75 MMHG | SYSTOLIC BLOOD PRESSURE: 124 MMHG

## 2018-09-16 RX ADMIN — FOLIC ACID SCH MG: 1 TABLET ORAL at 08:27

## 2018-09-16 RX ADMIN — Medication SCH MG: at 08:26

## 2018-09-16 RX ADMIN — CETIRIZINE HYDROCHLORIDE SCH MG: 10 TABLET, FILM COATED ORAL at 08:26

## 2018-09-16 RX ADMIN — Medication SCH MG: at 08:27

## 2018-09-16 RX ADMIN — Medication SCH MG: at 21:02

## 2018-09-16 RX ADMIN — PANTOPRAZOLE SODIUM SCH MG: 40 TABLET, DELAYED RELEASE ORAL at 06:48

## 2018-09-16 RX ADMIN — THERA TABS SCH UDTAB: TAB at 08:27

## 2018-09-16 RX ADMIN — LAMOTRIGINE SCH MG: 100 TABLET ORAL at 21:02

## 2018-09-16 NOTE — NUR
CIWA 10- Pt has flushed face, emotional/tearful, mild anxiety,  fine tremors and malaise. 
She reports feeling guilty and exhibits dysphoria.

## 2018-09-16 NOTE — NUR
START OF SHIFT



Patient is a 33-year-old female admitted on 9/10/18 for ETOH and benzodiazepine withdrawal. 
Patient has completed a 5-day Valium taper yesterday, tolerated well, scheduled for 
discharge tomorrow. Patients last CIWA was 10 per report. Patient received no PRN 
medications during the day. Upon assessment, patient is alert and oriented x4. Patient 
appears disheveled and unkempt, with a worried expression. Patient complains of anxiety, 
especially related to discharge tomorrow and about the unknown. Patient verbalizes feeling 
better after getting all of that off my chest. Patient is currently on fall and seizure 
precautions with no history of seizure. Safety measures in place, side rails up x2, bed 
locked in low position, call light within reach. Will continue to monitor.

## 2018-09-16 NOTE — NUR
CIWA 11- Pt c/o moderate anxiety,  fatigue, she has flushed face, fine tremors, difficulty 
concentrating, and anhedonia.

## 2018-09-16 NOTE — NUR
PRN BENADRYL REASSESSMENT



Patient is observed in bed with eyes closed, respirations even and unlabored. PRN Benadryl 
noted to be effective. Safety measures in place, side rails up x2, bed locked in low 
position, call light within reach. Will continue to monitor.

## 2018-09-16 NOTE — NUR
End of Shift

Last CIWA 10 at 1600.  Pt completed a 5 day Valium taper. Pt scheduled for tomorrow to 
Breathe.  Pt c/o anxiety,  fatigue, flushed face, difficulty concentrating, emotional, 
tearful at times, exhibits anhedonia and depressed mood. No PRN given today. Encouraged Pt 
to participate in group activities, socialize with others and identify positive coping 
skills to maintain sobriety. PO fluids 2000 ml, voids x4, BMx2. Fall precautions and all 
safety measures in place. Side rails up x2. Call light functioning and within reach. All 
needs attended and met. Will continue to assess for withdrawal symptoms.  Endorsed to PM 
shift.

## 2018-09-16 NOTE — NUR
CIWA 10- Pt has flushed face, anxiety,  fatigue, fine tremors, she is withdrawn and has 
dysphoria and anhedonia.

## 2018-09-16 NOTE — NUR
PRN BENADRYL



Patient reports difficulty sleeping and requests sleep aid. PRN Benadryl 50mg given PO. 
Safety measures in place, side rails up x2, bed locked in low position, call light within 
reach. Will monitor for effectiveness.

## 2018-09-16 NOTE — NUR
End of shift:   

Patient is a 33 yr old female who was admitted to [facility] on 9/10 /18 for a medically 
supervised withdrawal from ETOH ( Vodka), Benzodiazepines ( Klonopin) and also states she 
uses Ambien occasionally, she has been placed on a 5 day Valium taper and this is day 5. PRN 
Benadryl  was given for request to sleep.  Her fluid intake was 1000 ml, 2 voids, 0 BM, she 
slept for  10 hours.  Continue to follow MD plan of care and offer support as needed. 
Endorsed to day shift nurse.

## 2018-09-16 NOTE — NUR
CIWA 9



Patient has a current CIWA of 9; patient is emotional and anxious, expressing worry about 
the future and about going to treatment.

## 2018-09-16 NOTE — NUR
Start of Shift

Received report from night shift. Upon start of shift Pt was in room watching TV. Last CIWA 
11 at 2000.  Pt is on 5 day Valium taper, today is day 5. Pt slept 10 hours last night. Pt 
A&Ox4.  Pt withdrawal symptoms include anxiety,  fatigue, flushed face, difficulty 
concentrating, anhedonia and depressed mood. Encouraged Pt to participate in group 
activities, socialize with others and identify positive coping skills to maintain sobriety. 
Fall precautions and all safety measures in place. Side rails up x2. Call light functioning 
and within reach. All needs attended and met. Will continue to assess for withdrawal 
symptoms.

## 2018-09-17 VITALS — DIASTOLIC BLOOD PRESSURE: 72 MMHG | SYSTOLIC BLOOD PRESSURE: 112 MMHG

## 2018-09-17 VITALS — DIASTOLIC BLOOD PRESSURE: 62 MMHG | SYSTOLIC BLOOD PRESSURE: 104 MMHG

## 2018-09-17 RX ADMIN — CETIRIZINE HYDROCHLORIDE SCH MG: 10 TABLET, FILM COATED ORAL at 08:24

## 2018-09-17 RX ADMIN — Medication SCH MG: at 08:24

## 2018-09-17 RX ADMIN — FOLIC ACID SCH MG: 1 TABLET ORAL at 08:24

## 2018-09-17 RX ADMIN — THERA TABS SCH UDTAB: TAB at 08:24

## 2018-09-17 RX ADMIN — PANTOPRAZOLE SODIUM SCH MG: 40 TABLET, DELAYED RELEASE ORAL at 06:55

## 2018-09-17 RX ADMIN — Medication SCH MG: at 08:28

## 2018-09-17 NOTE — NUR
CIWA DEFERRED, VITALS REFUSED



CIWA deferred at this time due to patient sleeping; to be assessed and scored while patient 
is awake. Vitals refused at this time, respirations even and unlabored. Safety measures in 
place, call light within reach. Will continue to monitor.

## 2018-09-17 NOTE — NUR
START OF SHIFT NOTE

Received report from night nurse, 33 year old female admitted for ETOH and Benzo,Ambien 
withdrawal. Patient completed her 5 days Valium taper tolerated well. Per endorsement 
patient was given PRN Benadryl effective per night nurse, slept for 9 hours and last CIWA 
was-9. Received patient alert awake oriented x4 lying in her bed in stable condition. 
Patient is excited and motivated for her further treatment. Skin intact warm and dry to 
touch. No s/s of distress noted. All safety measures in place. Will cont with plan of care.

## 2018-09-17 NOTE — NUR
END OF SHIFT



Patient is a 33-year-old female admitted on 9/10/18 for ETOH and benzodiazepine withdrawal. 
Patient has completed a 5-day Valium taper on 9/15/18, tolerated well, scheduled for 
discharge this morning. Patients last CIWA was 9. Patient received PRN Benadryl for 
difficulty sleeping; noted to be effective.  Patient slept for 9 hours, total intake of 
1,755mL, void x4, stool x1.  Patient is currently on fall and seizure precautions with no 
history of seizure. Safety measures in place, side rails up x2, bed locked in low position, 
call light within reach. Will endorse to day shift.

## 2018-09-17 NOTE — NUR
DISCHARGE NOTE

Patient is alert awake oriented discharge from [facility] in stable condition. 
Vital signs WNL. Skin intact warm and dry to touch. Patient denies any SI/HI. All discharge 
paper work completed signed and dated. All belongings returned to the patient including her 
home medication and prescriptions and personal belongings. Patient discharge from [facility] 
to [facility] RTC in stable condition at 0950 on 09/17/18.

## 2018-09-17 NOTE — NUR
VITALS REFUSED, CIWA DEFERRED



CIWA deferred at this time due to patient sleeping, to be assessed and scored while patient 
is awake. Vitals refused, respirations even and unlabored. Safety measures in place, bed 
locked in low position, side rails up x2, call light within reach. Will continue to monitor.

## 2019-02-28 NOTE — NUR
CIWA 11



Pt has been taking a nap and had awaken. Pt presents a flushed face, anxiety, restlessness, 
fatigue, headache, sweating, light sensitivity, and tremors are felt. Refuses prns at this 
time. Valium will be administered. Will continue to monitor. Per order of Aleja Saravia PA-C a post void residual was done via Bladder scanner.  PVR was 97 cc.     Above performed by Awa Lovelace MA